# Patient Record
Sex: FEMALE | Race: OTHER | HISPANIC OR LATINO | URBAN - METROPOLITAN AREA
[De-identification: names, ages, dates, MRNs, and addresses within clinical notes are randomized per-mention and may not be internally consistent; named-entity substitution may affect disease eponyms.]

---

## 2019-10-12 ENCOUNTER — EMERGENCY (EMERGENCY)
Facility: HOSPITAL | Age: 49
LOS: 1 days | Discharge: HOME | End: 2019-10-12
Attending: EMERGENCY MEDICINE
Payer: COMMERCIAL

## 2019-10-12 VITALS
DIASTOLIC BLOOD PRESSURE: 79 MMHG | HEIGHT: 60 IN | OXYGEN SATURATION: 99 % | TEMPERATURE: 98 F | RESPIRATION RATE: 18 BRPM | HEART RATE: 91 BPM | SYSTOLIC BLOOD PRESSURE: 145 MMHG | WEIGHT: 225.09 LBS

## 2019-10-12 PROCEDURE — 71046 X-RAY EXAM CHEST 2 VIEWS: CPT | Mod: 26

## 2019-10-12 PROCEDURE — 93010 ELECTROCARDIOGRAM REPORT: CPT

## 2019-10-12 PROCEDURE — 93005 ELECTROCARDIOGRAM TRACING: CPT

## 2019-10-12 PROCEDURE — 99283 EMERGENCY DEPT VISIT LOW MDM: CPT

## 2019-10-12 PROCEDURE — 71046 X-RAY EXAM CHEST 2 VIEWS: CPT

## 2019-10-12 PROCEDURE — 99283 EMERGENCY DEPT VISIT LOW MDM: CPT | Mod: 25

## 2019-10-12 RX ORDER — CYCLOBENZAPRINE HYDROCHLORIDE 10 MG/1
1 TABLET, FILM COATED ORAL
Qty: 15 | Refills: 0
Start: 2019-10-12 | End: 2019-10-16

## 2019-10-12 RX ORDER — IBUPROFEN 200 MG
600 TABLET ORAL ONCE
Refills: 0 | Status: COMPLETED | OUTPATIENT
Start: 2019-10-12 | End: 2019-10-12

## 2019-10-12 RX ORDER — METHOCARBAMOL 500 MG/1
1500 TABLET, FILM COATED ORAL ONCE
Refills: 0 | Status: COMPLETED | OUTPATIENT
Start: 2019-10-12 | End: 2019-10-12

## 2019-10-12 RX ADMIN — Medication 600 MILLIGRAM(S): at 18:25

## 2019-10-12 RX ADMIN — METHOCARBAMOL 1500 MILLIGRAM(S): 500 TABLET, FILM COATED ORAL at 18:25
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
## 2019-10-12 NOTE — ED STATDOCS - MUSCULOSKELETAL, MLM
(+) Mild tenderness to R upper chest wall (+) Tenderness over the right MSK. (-) No spinal tenderness. range of motion is not limited and there is no muscle tenderness. (+) Mild tenderness to R upper chest wall (+) Tenderness over the right MSK. (-) No spinal tenderness. range of motion is not limited and there is no muscle tenderness. FROMI of UE b/l . Distally NVI UE.

## 2019-10-12 NOTE — ED STATDOCS - PROGRESS NOTE DETAILS
Discussed CXR with radiology. States films is unremarkable, no concern for fx.   Pt feeling better on reassessment. Will rx ibuprofen/flexeril.   Discussed return precautions, f/u with PCP.
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
## 2019-10-12 NOTE — ED STATDOCS - PATIENT PORTAL LINK FT
You can access the FollowMyHealth Patient Portal offered by [facility] by registering at the following website: [email or web address]. By joining [facility]’s FollowMyHealth portal, you will also be able to view your health information using other applications (apps) compatible with our system.

## 2021-01-19 NOTE — ED ADULT NURSE NOTE - NSIMPLEMENTINTERV_GEN_ALL_ED
full range of motion in all extremities
Implemented All Universal Safety Interventions:  [place] to call system. Call bell, personal items and telephone within reach. Instruct patient to call for assistance. Room bathroom lighting operational. Non-slip footwear when patient is off stretcher. Physically safe environment: no spills, clutter or unnecessary equipment. Stretcher in lowest position, wheels locked, appropriate side rails in place.

## 2022-12-21 ENCOUNTER — OFFICE (OUTPATIENT)
Dept: URBAN - METROPOLITAN AREA CLINIC 115 | Facility: CLINIC | Age: 52
Setting detail: OPHTHALMOLOGY
End: 2022-12-21
Payer: COMMERCIAL

## 2022-12-21 DIAGNOSIS — H04.123: ICD-10-CM

## 2022-12-21 DIAGNOSIS — H01.002: ICD-10-CM

## 2022-12-21 DIAGNOSIS — H01.005: ICD-10-CM

## 2022-12-21 DIAGNOSIS — H04.122: ICD-10-CM

## 2022-12-21 DIAGNOSIS — H43.393: ICD-10-CM

## 2022-12-21 DIAGNOSIS — H04.121: ICD-10-CM

## 2022-12-21 PROCEDURE — 92250 FUNDUS PHOTOGRAPHY W/I&R: CPT | Performed by: OPHTHALMOLOGY

## 2022-12-21 PROCEDURE — 92014 COMPRE OPH EXAM EST PT 1/>: CPT | Performed by: OPHTHALMOLOGY

## 2022-12-21 PROCEDURE — 83861 MICROFLUID ANALY TEARS: CPT | Performed by: OPHTHALMOLOGY

## 2022-12-21 ASSESSMENT — REFRACTION_MANIFEST
OU_VA: 20/20
OD_CYLINDER: -0.75
OS_AXIS: 166
OD_VA2: 20/20
OS_SPHERE: -1.75
OD_VA1: 20/20
OS_VA1: 20/20
OD_AXIS: 015
OS_CYLINDER: -1.50
OS_VA2: 20/20
OD_SPHERE: -1.75
OS_ADD: +1.75
OD_ADD: +1.75

## 2022-12-21 ASSESSMENT — SUPERFICIAL PUNCTATE KERATITIS (SPK)
OD_SPK: 1+
OS_SPK: 1+

## 2022-12-21 ASSESSMENT — VISUAL ACUITY
OS_BCVA: 20/20
OD_BCVA: 20/20-1

## 2022-12-21 ASSESSMENT — AXIALLENGTH_DERIVED
OD_AL: 23.2763
OD_AL: 23.0882
OS_AL: 23.1818
OS_AL: 23.4193

## 2022-12-21 ASSESSMENT — KERATOMETRY
OD_K1POWER_DIOPTERS: 45.75
OS_AXISANGLE_DEGREES: 098
OD_AXISANGLE_DEGREES: 084
OS_K1POWER_DIOPTERS: 45.75
OD_K2POWER_DIOPTERS: 47.50
OS_K2POWER_DIOPTERS: 47.50

## 2022-12-21 ASSESSMENT — CONFRONTATIONAL VISUAL FIELD TEST (CVF)
OS_FINDINGS: FULL
OD_FINDINGS: FULL

## 2022-12-21 ASSESSMENT — REFRACTION_AUTOREFRACTION
OD_SPHERE: -1.25
OS_AXIS: 165
OD_AXIS: 015
OD_CYLINDER: -0.75
OS_SPHERE: -1.25
OS_CYLINDER: -1.25

## 2022-12-21 ASSESSMENT — REFRACTION_CURRENTRX
OD_ADD: +1.75
OD_SPHERE: -1.50
OS_CYLINDER: -1.50
OS_OVR_VA: 20/
OS_CYLINDER: -1.50
OS_OVR_VA: 20/
OD_VPRISM_DIRECTION: PROGS
OD_ADD: +1.75
OS_ADD: -1.75
OS_VPRISM_DIRECTION: PROGS
OS_VPRISM_DIRECTION: PROGS
OS_SPHERE: -1.75
OS_SPHERE: -1.25
OD_CYLINDER: -0.75
OD_AXIS: 023
OD_CYLINDER: -1.00
OS_AXIS: 166
OD_SPHERE: -1.75
OD_OVR_VA: 20/
OS_ADD: +1.75
OD_AXIS: 010
OD_VPRISM_DIRECTION: PROGS
OS_AXIS: 165
OD_OVR_VA: 20/

## 2022-12-21 ASSESSMENT — LID EXAM ASSESSMENTS
OD_BLEPHARITIS: RLL 1+
OS_BLEPHARITIS: LLL 1+

## 2022-12-21 ASSESSMENT — SPHEQUIV_DERIVED
OS_SPHEQUIV: -2.5
OS_SPHEQUIV: -1.875
OD_SPHEQUIV: -2.125
OD_SPHEQUIV: -1.625

## 2022-12-21 ASSESSMENT — TONOMETRY: OD_IOP_MMHG: 19

## 2023-01-16 NOTE — ED ADULT TRIAGE NOTE - IDEAL BODY WEIGHT(KG)
46

## 2024-08-05 ENCOUNTER — EMERGENCY (EMERGENCY)
Facility: HOSPITAL | Age: 54
LOS: 1 days | Discharge: HOME | End: 2024-08-05
Attending: EMERGENCY MEDICINE
Payer: COMMERCIAL

## 2024-08-05 VITALS
DIASTOLIC BLOOD PRESSURE: 88 MMHG | TEMPERATURE: 98 F | HEIGHT: 62 IN | HEART RATE: 81 BPM | RESPIRATION RATE: 20 BRPM | OXYGEN SATURATION: 98 % | WEIGHT: 229.28 LBS | SYSTOLIC BLOOD PRESSURE: 156 MMHG

## 2024-08-05 PROCEDURE — 99284 EMERGENCY DEPT VISIT MOD MDM: CPT

## 2024-08-05 PROCEDURE — 93971 EXTREMITY STUDY: CPT

## 2024-08-05 PROCEDURE — 73564 X-RAY EXAM KNEE 4 OR MORE: CPT

## 2024-08-05 PROCEDURE — T1013: CPT

## 2024-08-05 PROCEDURE — 73564 X-RAY EXAM KNEE 4 OR MORE: CPT | Mod: 26,RT

## 2024-08-05 PROCEDURE — 93971 EXTREMITY STUDY: CPT | Mod: 26,RT

## 2024-08-05 PROCEDURE — 99284 EMERGENCY DEPT VISIT MOD MDM: CPT | Mod: 25

## 2024-08-05 RX ORDER — IBUPROFEN 200 MG
600 TABLET ORAL ONCE
Refills: 0 | Status: COMPLETED | OUTPATIENT
Start: 2024-08-05 | End: 2024-08-05

## 2024-08-05 RX ADMIN — Medication 600 MILLIGRAM(S): at 16:03

## 2024-08-05 NOTE — ED PROVIDER NOTE - CLINICAL SUMMARY MEDICAL DECISION MAKING FREE TEXT BOX
54-year-old female with no significant past medical history presented to the ED complaining of pain behind her right knee that began approximately 2 weeks ago after traveling for approximately 5 hours in the car without rest.  Patient states that she also went hiking while away recently. X-rays reviewed shows multiple calcifications otherwise no acute fracture or dislocation.  Ultrasound negative for acute DVT.  Patient's symptoms likely consistent with calcific tendinitis.  Patient stable for discharge orthopedic follow-up.  Return precautions discussed with patient.

## 2024-08-05 NOTE — ED PROVIDER NOTE - OBJECTIVE STATEMENT
54-year-old female with no significant past medical history presented to the ED complaining of pain behind her right knee that began approximately 2 weeks ago after traveling for approximately 5 hours in the car without rest.  Patient states that she also went hiking while away recently. Pt otherwise denies fever/chills, c/p, sob, abd pain, n/v/c/d, dysuria, numbness/tingling/weakness, tick bite/rash and has no other complaints at this time.
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
## 2024-08-05 NOTE — ED PROVIDER NOTE - MUSCULOSKELETAL, MLM
+ Mild TTP of the right posterior knee with full range of motion intact without pain.  No joint laxity.  Patella midline.  Calf soft and nontender.  Sensation intact light touch throughout.  2+ distal pulses intact.  Compartment soft and compressible.  Skin color within normal limits.

## 2024-08-05 NOTE — ED PROVIDER NOTE - PATIENT PORTAL LINK FT
You can access the FollowMyHealth Patient Portal offered by [facility] by registering at the following website: [email or web address]. By joining [facility]’s FollowMyHealth portal, you will also be able to view your health information using other applications (apps) compatible with our system.

## 2024-08-05 NOTE — ED PROVIDER NOTE - CARE PROVIDER_API CALL
Smith Cheng  Orthopaedic Surgery  [address] [place] [address]  Phone: [phone]  Fax: [phone]  Follow Up Time:

## 2024-10-16 ENCOUNTER — OFFICE (OUTPATIENT)
Dept: URBAN - METROPOLITAN AREA CLINIC 115 | Facility: CLINIC | Age: 54
Setting detail: OPHTHALMOLOGY
End: 2024-10-16
Payer: COMMERCIAL

## 2024-10-16 DIAGNOSIS — H52.4: ICD-10-CM

## 2024-10-16 DIAGNOSIS — H43.393: ICD-10-CM

## 2024-10-16 DIAGNOSIS — H25.13: ICD-10-CM

## 2024-10-16 PROBLEM — H52.7 REFRACTIVE ERROR: Status: ACTIVE | Noted: 2024-10-16

## 2024-10-16 PROBLEM — H16.223 DRY EYE SYNDROME K SICCA; BOTH EYES: Status: ACTIVE | Noted: 2024-10-16

## 2024-10-16 PROCEDURE — 92014 COMPRE OPH EXAM EST PT 1/>: CPT | Performed by: OPTOMETRIST

## 2024-10-16 PROCEDURE — 92015 DETERMINE REFRACTIVE STATE: CPT | Performed by: OPTOMETRIST

## 2024-10-16 ASSESSMENT — REFRACTION_MANIFEST
OD_SPHERE: -1.00
OD_AXIS: 008
OS_ADD: +1.75
OU_VA: 20/20
OD_VA1: 20/20
OS_CYLINDER: -1.50
OD_CYLINDER: -1.00
OS_AXIS: 168
OD_ADD: +2.00
OD_VA2: 20/20
OD_AXIS: 015
OD_ADD: +1.75
OS_VA1: 20/20
OD_VA1: 20/20
OS_AXIS: 166
OS_SPHERE: -1.75
OS_CYLINDER: -1.50
OD_CYLINDER: -0.75
OS_ADD: +2.00
OS_VA2: 20/20
OS_SPHERE: -1.00
OD_SPHERE: -1.75
OS_VA1: 20/20

## 2024-10-16 ASSESSMENT — REFRACTION_CURRENTRX
OS_AXIS: 166
OS_ADD: +1.25
OS_SPHERE: -1.75
OS_ADD: +1.75
OD_VPRISM_DIRECTION: PROGS
OD_ADD: +1.25
OD_SPHERE: -1.50
OS_OVR_VA: 20/
OS_CYLINDER: -1.50
OD_AXIS: 023
OS_OVR_VA: 20/
OD_CYLINDER: -0.75
OD_SPHERE: -1.50
OD_ADD: +1.75
OD_AXIS: 010
OS_AXIS: 166
OD_OVR_VA: 20/
OS_CYLINDER: -1.50
OD_VPRISM_DIRECTION: PROGS
OS_SPHERE: -1.75
OS_CYLINDER: -1.50
OD_OVR_VA: 20/
OS_SPHERE: -1.25
OD_SPHERE: -1.75
OS_VPRISM_DIRECTION: PROGS
OD_CYLINDER: -1.00
OS_VPRISM_DIRECTION: PROGS
OS_ADD: -1.75
OS_VPRISM_DIRECTION: PROGS
OD_CYLINDER: -0.75
OS_OVR_VA: 20/
OD_ADD: +1.75
OD_OVR_VA: 20/
OD_VPRISM_DIRECTION: PROGS
OS_AXIS: 165
OD_AXIS: 019

## 2024-10-16 ASSESSMENT — KERATOMETRY
OS_AXISANGLE_DEGREES: 098
OD_K2POWER_DIOPTERS: 47.50
OS_K2POWER_DIOPTERS: 47.50
OS_K1POWER_DIOPTERS: 45.75
OD_AXISANGLE_DEGREES: 084
OD_K1POWER_DIOPTERS: 45.75

## 2024-10-16 ASSESSMENT — CONFRONTATIONAL VISUAL FIELD TEST (CVF)
OD_FINDINGS: FULL
OS_FINDINGS: FULL

## 2024-10-16 ASSESSMENT — SUPERFICIAL PUNCTATE KERATITIS (SPK)
OS_SPK: 1+
OD_SPK: 1+

## 2024-10-16 ASSESSMENT — REFRACTION_AUTOREFRACTION
OS_AXIS: 168
OS_SPHERE: -1.00
OS_CYLINDER: -1.50
OD_SPHERE: -1.00
OD_AXIS: 008
OD_CYLINDER: -1.00

## 2024-10-16 ASSESSMENT — VISUAL ACUITY
OS_BCVA: 20/30-
OD_BCVA: 20/40

## 2024-10-16 ASSESSMENT — TONOMETRY
OS_IOP_MMHG: 19
OD_IOP_MMHG: 20

## 2024-10-16 ASSESSMENT — LID EXAM ASSESSMENTS
OD_BLEPHARITIS: RLL 1+
OS_BLEPHARITIS: LLL 1+